# Patient Record
(demographics unavailable — no encounter records)

---

## 2024-11-15 NOTE — HISTORY OF PRESENT ILLNESS
[FreeTextEntry1] : 77 YO M  seen on 12/3/2019 for his BPH without outlet obstruction and NGB by urodynamics testing done 10/16/2019. He also had a negative cysto, negative FISH test, PSA of 1.6 (25%) all in 2019. Patient tells me that he continues to take the oxybutynin 5 mg and doxazosin 2 mg daily and these are renewed by his PCP. They are working satisfactorily, and he reports less urinary frequency, less urgency, and no nocturia on this regimen. He tells me that his DM is under better control and we reviewed the importance of good diabetic control on his bladder function and health in general. at that time I recommended that he continue on his present regimen.  Patient is seen 2020 via [facility] for interval reassessment and medication review, along with interval assessment of his prior atypical cytology with a negative FISH test 10/2019. He tells me that he is able to sleep through the night and has no dysuria or hematuria. He voids with a good flow, and remains on the oxybutynin 5 mg and doxazosin 2 mg, although h has noted some recent increase in the urgency which corresponded to missing a few doses of the doxazosin.   Medication renewed, blood and urine tests ordered. BMP stable PSA 1.65 stable cytology negative FISH negative  Patient seen  2021 to reassess his symptoms. He continues to have urgency as the main problem with occasional urge incontinence despite the oxybutynin 5 mg daily and the doxazosin 2 mg daily,  UA trace heme, 1000 Glu (due to his DM medication). IPSS 7 MEGAN 3 We discussed the patient's persistent urinary urgency in the face of a very low residual bladder volume. I offered increasing the oxybutynin to 10 mg daily and we reviewed the indications risks alternatives and chances for success with this change. Because of concerns over increase and potential side effects and dry mouth, the patient decided to wait before increasing the dosage. For now we will remain on the doxazosin 2 mg and oxybutynin 5 mg daily and reassess in 6 months. I did send urine for culture cytology and FISH test today because of his chronic microscopic hematuria and previous urinary atypia and I sent blood for PSA test today.  C+A, Cytology, FISH all negative PSA 1.86  Patient seen 2022 to reassess. Patient states he is doing well. He has recently started Rybelsus for DM and has lost some weight and his DM is under control. He has noticed improvement in his urination. He has less urgency and frequency. He continues on the doxazosin and oxybutynin. He also reports some erectile dysfunction that he is interested in treating. He has difficulty initiating an erection and has not yet tried any PDE5i medication.  Pelvic US: PVR: 52 ml, Prostate: 29 cc  Uroflow normal: Max: 28 ml/s Av ml/s Vol: 241 cc UA: 1000 glucose (DM)  DM, HTN remain stable. Allergic to codeine and morphine.  The patient denies fevers, chills, nausea and or vomiting and no unexplained weight loss.  All pertinent parts of the patient PFSH (past medical, family and social histories), laboratory, radiological studies and physician notes were reviewed prior to starting the face to face portion of the visit. Questionnaire results were discussed with patient. The patient presents with BPH and LUTS that are stable on doxazosin and oxybutynin which is renewed by his PCP. A PSA was drawn today. Urine was negative today except for glucose. The patient is on DM medication, although none of his medications are known to cause glycosuria. We miranda blood for BMP to evaluate his glucose and will call with these results. As for the ED, we will prescribe sildenafil 100 mg PRN at [facility] pharmacy with refills. Indications, goals and side effects discussed. If all stable, he will follow up in 1 year. C+S, cytology negative,  FISH too few cell PSA 1.82  Patient seen 2023 to reassess. He told me that he is urinating well with a good stream and no nocturia or gross hematuria on the oxybutynin and doxazosin. These have been renewed by his PCP Reta. He is presently on a new regimen for his DM and has lost 50 ponds. As for the ED, he is presently not active and is not presently using the sildenafil. UA 1000 Glucose only PSS 4 ANGEL 6 MEGAN 2 Mr. Urban's urinary symptoms are stable on his present medication regimen and I recommended that he continue to maintain this. Blood was sent for PSA today. If that remains stable, then follow-up in 1 year as for his previous abnormal urinary cytology, results last year including cytology were negative and we repeated a urine cytology today. As for the ED, we will readdress this if and when the patient desires.  PSA 1.95 stable Cytology negative Results to patient by phone, FU as planned 1 year.   Patient seen TODAY 11/15/2024 to reassess. He told me that since his last visit he has lost 50 lb. through a combination of diet, medication and exercise. He has noted less LUTS as a result. He has not used the sildenafil due to the lack of a partner, but has also experienced a loss of libido. He remains on the Jardiance for his DM. UA  IPSS 2 ANGEL 4 MEGAN 1

## 2024-11-15 NOTE — PHYSICAL EXAM
[Normal Appearance] : normal appearance [General Appearance - In No Acute Distress] : no acute distress [Edema] : no peripheral edema [] : no respiratory distress [Abdomen Soft] : soft [Abdomen Tenderness] : non-tender [Abdomen Mass (___ Cm)] : no abdominal mass palpated [Costovertebral Angle Tenderness] : no ~M costovertebral angle tenderness [Urethral Meatus] : meatus normal [Penis Abnormality] : normal circumcised penis [Urinary Bladder Findings] : the bladder was normal on palpation [Scrotum] : the scrotum was normal [Epididymis] : the epididymides were normal [Testes Tenderness] : no tenderness of the testes [Testes Mass (___cm)] : there were no testicular masses [Prostate Tenderness] : the prostate was not tender [No Prostate Nodules] : no prostate nodules [Prostate Size ___ (0-4)] : prostate size [unfilled] (scale: 0-4) [Normal Station and Gait] : the gait and station were normal for the patient's age [Skin Turgor] : supple [No Focal Deficits] : no focal deficits [Oriented To Time, Place, And Person] : oriented to person, place, and time [Affect] : the affect was normal [Mood] : the mood was normal

## 2024-11-15 NOTE — ASSESSMENT
[FreeTextEntry1] : Evaluation today indicates minimal and improved lower urinary tract symptoms off medication.  Blood was sent for PSA test today we will review that result by phone.  In view of his previous abnormal urinary cytology, urine also sent for culture cytology testing today.  As for his ED, he has not yet had occasion to use the sildenafil and does not require more at this time.  As for his decreasing libido, blood was sent today for total testosterone drawn at 930 this morning.  If this demonstrates abnormalities, we will follow-up with a full hormonal profile.  Otherwise follow-up in 1 year. Stephanie Raymundo MD

## 2024-11-15 NOTE — HISTORY OF PRESENT ILLNESS
[FreeTextEntry1] : 77 YO M  seen on 12/3/2019 for his BPH without outlet obstruction and NGB by urodynamics testing done 10/16/2019. He also had a negative cysto, negative FISH test, PSA of 1.6 (25%) all in 2019. Patient tells me that he continues to take the oxybutynin 5 mg and doxazosin 2 mg daily and these are renewed by his PCP. They are working satisfactorily, and he reports less urinary frequency, less urgency, and no nocturia on this regimen. He tells me that his DM is under better control and we reviewed the importance of good diabetic control on his bladder function and health in general. at that time I recommended that he continue on his present regimen.  Patient is seen 2020 via [facility] for interval reassessment and medication review, along with interval assessment of his prior atypical cytology with a negative FISH test 10/2019. He tells me that he is able to sleep through the night and has no dysuria or hematuria. He voids with a good flow, and remains on the oxybutynin 5 mg and doxazosin 2 mg, although h has noted some recent increase in the urgency which corresponded to missing a few doses of the doxazosin.   Medication renewed, blood and urine tests ordered. BMP stable PSA 1.65 stable cytology negative FISH negative  Patient seen  2021 to reassess his symptoms. He continues to have urgency as the main problem with occasional urge incontinence despite the oxybutynin 5 mg daily and the doxazosin 2 mg daily,  UA trace heme, 1000 Glu (due to his DM medication). IPSS 7 MEGAN 3 We discussed the patient's persistent urinary urgency in the face of a very low residual bladder volume. I offered increasing the oxybutynin to 10 mg daily and we reviewed the indications risks alternatives and chances for success with this change. Because of concerns over increase and potential side effects and dry mouth, the patient decided to wait before increasing the dosage. For now we will remain on the doxazosin 2 mg and oxybutynin 5 mg daily and reassess in 6 months. I did send urine for culture cytology and FISH test today because of his chronic microscopic hematuria and previous urinary atypia and I sent blood for PSA test today.  C+A, Cytology, FISH all negative PSA 1.86  Patient seen 2022 to reassess. Patient states he is doing well. He has recently started Rybelsus for DM and has lost some weight and his DM is under control. He has noticed improvement in his urination. He has less urgency and frequency. He continues on the doxazosin and oxybutynin. He also reports some erectile dysfunction that he is interested in treating. He has difficulty initiating an erection and has not yet tried any PDE5i medication.  Pelvic US: PVR: 52 ml, Prostate: 29 cc  Uroflow normal: Max: 28 ml/s Av ml/s Vol: 241 cc UA: 1000 glucose (DM)  DM, HTN remain stable. Allergic to codeine and morphine.  The patient denies fevers, chills, nausea and or vomiting and no unexplained weight loss.  All pertinent parts of the patient PFSH (past medical, family and social histories), laboratory, radiological studies and physician notes were reviewed prior to starting the face to face portion of the visit. Questionnaire results were discussed with patient. The patient presents with BPH and LUTS that are stable on doxazosin and oxybutynin which is renewed by his PCP. A PSA was drawn today. Urine was negative today except for glucose. The patient is on DM medication, although none of his medications are known to cause glycosuria. We miranda blood for BMP to evaluate his glucose and will call with these results. As for the ED, we will prescribe sildenafil 100 mg PRN at capsule pharmacy with refills. Indications, goals and side effects discussed. If all stable, he will follow up in 1 year. C+S, cytology negative,  FISH too few cell PSA 1.82  Patient seen 2023 to reassess. He told me that he is urinating well with a good stream and no nocturia or gross hematuria on the oxybutynin and doxazosin. These have been renewed by his PCP Reta. He is presently on a new regimen for his DM and has lost 50 ponds. As for the ED, he is presently not active and is not presently using the sildenafil. UA 1000 Glucose only PSS 4 ANGEL 6 MEGAN 2 Mr. Urban's urinary symptoms are stable on his present medication regimen and I recommended that he continue to maintain this. Blood was sent for PSA today. If that remains stable, then follow-up in 1 year as for his previous abnormal urinary cytology, results last year including cytology were negative and we repeated a urine cytology today. As for the ED, we will readdress this if and when the patient desires.  PSA 1.95 stable Cytology negative Results to patient by phone, FU as planned 1 year.   Patient seen TODAY 11/15/2024 to reassess. He told me that since his last visit he has lost 50 lb. through a combination of diet, medication and exercise. He has noted less LUTS as a result. He has not used the sildenafil due to the lack of a partner, but has also experienced a loss of libido. He remains on the Jardiance for his DM. UA  IPSS 2 ANGEL 4 MEGAN 1

## 2025-05-23 NOTE — LETTER BODY
[Dear  ___] : Dear  [unfilled], [Courtesy Letter:] : I had the pleasure of seeing your patient, [unfilled], in my office today. [Please see my note below.] : Please see my note below. [Consult Closing:] : Thank you very much for allowing me to participate in the care of this patient.  If you have any questions, please do not hesitate to contact me. [FreeTextEntry3] : Best Regards,   Stephanie Raymundo MD

## 2025-05-23 NOTE — HISTORY OF PRESENT ILLNESS
[FreeTextEntry1] : 75 YO M  seen on 12/3/2019 for his BPH without outlet obstruction and NGB by urodynamics testing done 10/16/2019. He also had a negative cysto, negative FISH test, PSA of 1.6 (25%) all in 2019. Patient tells me that he continues to take the oxybutynin 5 mg and doxazosin 2 mg daily and these are renewed by his PCP. They are working satisfactorily, and he reports less urinary frequency, less urgency, and no nocturia on this regimen. He tells me that his DM is under better control and we reviewed the importance of good diabetic control on his bladder function and health in general. at that time I recommended that he continue on his present regimen.  Patient is seen 2020 via [facility] for interval reassessment and medication review, along with interval assessment of his prior atypical cytology with a negative FISH test 10/2019. He tells me that he is able to sleep through the night and has no dysuria or hematuria. He voids with a good flow, and remains on the oxybutynin 5 mg and doxazosin 2 mg, although h has noted some recent increase in the urgency which corresponded to missing a few doses of the doxazosin.   Medication renewed, blood and urine tests ordered. BMP stable PSA 1.65 stable cytology negative FISH negative  Patient seen  2021 to reassess his symptoms. He continues to have urgency as the main problem with occasional urge incontinence despite the oxybutynin 5 mg daily and the doxazosin 2 mg daily,  UA trace heme, 1000 Glu (due to his DM medication). IPSS 7 MEGAN 3 We discussed the patient's persistent urinary urgency in the face of a very low residual bladder volume. I offered increasing the oxybutynin to 10 mg daily and we reviewed the indications risks alternatives and chances for success with this change. Because of concerns over increase and potential side effects and dry mouth, the patient decided to wait before increasing the dosage. For now we will remain on the doxazosin 2 mg and oxybutynin 5 mg daily and reassess in 6 months. I did send urine for culture cytology and FISH test today because of his chronic microscopic hematuria and previous urinary atypia and I sent blood for PSA test today.  C+A, Cytology, FISH all negative PSA 1.86  Patient seen 2022 to reassess. Patient states he is doing well. He has recently started Rybelsus for DM and has lost some weight and his DM is under control. He has noticed improvement in his urination. He has less urgency and frequency. He continues on the doxazosin and oxybutynin. He also reports some erectile dysfunction that he is interested in treating. He has difficulty initiating an erection and has not yet tried any PDE5i medication.  Pelvic US: PVR: 52 ml, Prostate: 29 cc  Uroflow normal: Max: 28 ml/s Av ml/s Vol: 241 cc UA: 1000 glucose (DM)  DM, HTN remain stable. Allergic to codeine and morphine.  The patient denies fevers, chills, nausea and or vomiting and no unexplained weight loss.  All pertinent parts of the patient PFSH (past medical, family and social histories), laboratory, radiological studies and physician notes were reviewed prior to starting the face to face portion of the visit. Questionnaire results were discussed with patient. The patient presents with BPH and LUTS that are stable on doxazosin and oxybutynin which is renewed by his PCP. A PSA was drawn today. Urine was negative today except for glucose. The patient is on DM medication, although none of his medications are known to cause glycosuria. We miranda blood for BMP to evaluate his glucose and will call with these results. As for the ED, we will prescribe sildenafil 100 mg PRN at capsule pharmacy with refills. Indications, goals and side effects discussed. If all stable, he will follow up in 1 year. C+S, cytology negative,  FISH too few cell PSA 1.82  Patient seen 2023 to reassess. He told me that he is urinating well with a good stream and no nocturia or gross hematuria on the oxybutynin and doxazosin. These have been renewed by his PCP Reta. He is presently on a new regimen for his DM and has lost 50 ponds. As for the ED, he is presently not active and is not presently using the sildenafil. UA 1000 Glucose only PSS 4 ANGEL 6 MEGAN 2 Mr. Urban's urinary symptoms are stable on his present medication regimen and I recommended that he continue to maintain this. Blood was sent for PSA today. If that remains stable, then follow-up in 1 year as for his previous abnormal urinary cytology, results last year including cytology were negative and we repeated a urine cytology today. As for the ED, we will readdress this if and when the patient desires.  PSA 1.95 stable Cytology negative Results to patient by phone, FU as planned 1 year.   Patient seen TODAY 11/15/2024 to reassess. He told me that since his last visit he has lost 50 lb. through a combination of diet, medication and exercise. He has noted less LUTS as a result. He has not used the sildenafil due to the lack of a partner, but has also experienced a loss of libido. He remains on the Jardiance for his DM. UA  IPSS 2 ANGEL 4 MEGAN 1 Evaluation today indicates minimal and improved lower urinary tract symptoms off medication. Blood was sent for PSA test today we will review that result by phone. In view of his previous abnormal urinary cytology, urine also sent for culture cytology testing today. As for his ED, he has not yet had occasion to use the sildenafil and does not require more at this time. As for his decreasing libido, blood was sent today for total testosterone drawn at 930 this morning. If this demonstrates abnormalities, we will follow-up with a full hormonal profile. Otherwise follow-up in 1 year.  PSA 2.28 slightly higher than 1.95 on 2023 but in acceptable range and velocity. C+S, cytology both negative. TT NA Results to patient by phone, FU as planned 1 year. He will come in to repeat TT in AM.   2024 AM   2024 AM lab results from 2024: E2 - 34 LH 4.1  FSH 3.5 PRO 9.4 Staff to contact patient to schedule FU to discuss TR  Patient seen TODAY 2025 to discuss TRT. He tells me that since last contact he has started therapy with vitamin B12 and this has improved his stamina.  He continues to have issues with ED but does not have appointment at this time.  He has sildenafil at home but has not had occasion to try it out yet. He continues to be on medication for HTN and DM.  GLU ANGEL 3

## 2025-05-23 NOTE — PHYSICAL EXAM
[General Appearance - In No Acute Distress] : no acute distress [Edema] : no peripheral edema [] : no respiratory distress [Abdomen Soft] : soft [Oriented To Time, Place, And Person] : oriented to person, place, and time [Affect] : the affect was normal [Mood] : the mood was normal [FreeTextEntry1] : Obese

## 2025-05-23 NOTE — ASSESSMENT
[FreeTextEntry1] : We discussed the results of the per patient's testosterone and PSA levels and we discussed the use of testosterone replacement therapy by several vehicles including testosterone cypionate injection, T-Gel, and patch, we reviewed the indications risks alternatives and chances for success with each and compared and contrasted the daily versus the weekly or biweekly administration.  At the patient's request we also discussed not instituting therapy at this time since he has enjoyed a benefit in his symptoms after being treated for vitamin B12 deficiency.  He is presently in a treatment regimen to increase his B12 stores and has already noted improvement in symptoms.  I recommended that we defer starting therapy with TRT at this time, repeat the total testosterone level in a few weeks and reassess.  He is in agreement with this approach and will come in for a blood draw for testosterone level as above. Stephanie Raymundo MD